# Patient Record
Sex: MALE | Race: WHITE | Employment: OTHER | URBAN - METROPOLITAN AREA
[De-identification: names, ages, dates, MRNs, and addresses within clinical notes are randomized per-mention and may not be internally consistent; named-entity substitution may affect disease eponyms.]

---

## 2018-09-14 ENCOUNTER — APPOINTMENT (OUTPATIENT)
Dept: GENERAL RADIOLOGY | Age: 78
End: 2018-09-14
Attending: EMERGENCY MEDICINE
Payer: MEDICARE

## 2018-09-14 ENCOUNTER — APPOINTMENT (OUTPATIENT)
Dept: CT IMAGING | Age: 78
End: 2018-09-14
Attending: EMERGENCY MEDICINE
Payer: MEDICARE

## 2018-09-14 ENCOUNTER — HOSPITAL ENCOUNTER (EMERGENCY)
Age: 78
Discharge: HOME | End: 2018-09-14
Attending: EMERGENCY MEDICINE
Payer: MEDICARE

## 2018-09-14 VITALS
SYSTOLIC BLOOD PRESSURE: 96 MMHG | BODY MASS INDEX: 31.16 KG/M2 | RESPIRATION RATE: 18 BRPM | HEIGHT: 64 IN | DIASTOLIC BLOOD PRESSURE: 84 MMHG | HEART RATE: 64 BPM | OXYGEN SATURATION: 100 % | WEIGHT: 182.5 LBS | TEMPERATURE: 97.5 F

## 2018-09-14 DIAGNOSIS — R42 LIGHTHEADEDNESS: Primary | ICD-10-CM

## 2018-09-14 LAB
ALBUMIN SERPL-MCNC: 3.7 G/DL (ref 3.4–5)
ALBUMIN/GLOB SERPL: 1.2 {RATIO} (ref 0.8–1.7)
ALP SERPL-CCNC: 81 U/L (ref 45–117)
ALT SERPL-CCNC: 29 U/L (ref 16–61)
ANION GAP SERPL CALC-SCNC: 12 MMOL/L (ref 3–18)
APAP SERPL-MCNC: <2 UG/ML (ref 10–30)
APTT PPP: 24.8 SEC (ref 23–36.4)
AST SERPL-CCNC: 21 U/L (ref 15–37)
BASOPHILS # BLD: 0 K/UL (ref 0–0.1)
BASOPHILS NFR BLD: 0 % (ref 0–2)
BILIRUB SERPL-MCNC: 0.7 MG/DL (ref 0.2–1)
BUN SERPL-MCNC: 12 MG/DL (ref 7–18)
BUN/CREAT SERPL: 11 (ref 12–20)
CALCIUM SERPL-MCNC: 8.6 MG/DL (ref 8.5–10.1)
CHLORIDE SERPL-SCNC: 103 MMOL/L (ref 100–108)
CK MB CFR SERPL CALC: 1.6 % (ref 0–4)
CK MB SERPL-MCNC: 2.8 NG/ML (ref 5–25)
CK SERPL-CCNC: 178 U/L (ref 39–308)
CO2 SERPL-SCNC: 24 MMOL/L (ref 21–32)
CREAT SERPL-MCNC: 1.14 MG/DL (ref 0.6–1.3)
DIFFERENTIAL METHOD BLD: ABNORMAL
EOSINOPHIL # BLD: 0.1 K/UL (ref 0–0.4)
EOSINOPHIL NFR BLD: 1 % (ref 0–5)
ERYTHROCYTE [DISTWIDTH] IN BLOOD BY AUTOMATED COUNT: 13.3 % (ref 11.6–14.5)
ETHANOL SERPL-MCNC: <3 MG/DL (ref 0–3)
GLOBULIN SER CALC-MCNC: 3.1 G/DL (ref 2–4)
GLUCOSE BLD STRIP.AUTO-MCNC: 172 MG/DL (ref 70–110)
GLUCOSE SERPL-MCNC: 165 MG/DL (ref 74–99)
HCT VFR BLD AUTO: 44 % (ref 36–48)
HGB BLD-MCNC: 15.2 G/DL (ref 13–16)
INR PPP: 1 (ref 0.8–1.2)
LYMPHOCYTES # BLD: 1 K/UL (ref 0.9–3.6)
LYMPHOCYTES NFR BLD: 14 % (ref 21–52)
MCH RBC QN AUTO: 32.1 PG (ref 24–34)
MCHC RBC AUTO-ENTMCNC: 34.5 G/DL (ref 31–37)
MCV RBC AUTO: 93 FL (ref 74–97)
MONOCYTES # BLD: 0.5 K/UL (ref 0.05–1.2)
MONOCYTES NFR BLD: 7 % (ref 3–10)
NEUTS SEG # BLD: 5.5 K/UL (ref 1.8–8)
NEUTS SEG NFR BLD: 78 % (ref 40–73)
PLATELET # BLD AUTO: 240 K/UL (ref 135–420)
PMV BLD AUTO: 10.1 FL (ref 9.2–11.8)
POTASSIUM SERPL-SCNC: 3.9 MMOL/L (ref 3.5–5.5)
PROT SERPL-MCNC: 6.8 G/DL (ref 6.4–8.2)
PROTHROMBIN TIME: 12.7 SEC (ref 11.5–15.2)
RBC # BLD AUTO: 4.73 M/UL (ref 4.7–5.5)
SALICYLATES SERPL-MCNC: <2.8 MG/DL (ref 2.8–20)
SODIUM SERPL-SCNC: 139 MMOL/L (ref 136–145)
TROPONIN I SERPL-MCNC: <0.02 NG/ML (ref 0–0.06)
WBC # BLD AUTO: 7.1 K/UL (ref 4.6–13.2)

## 2018-09-14 PROCEDURE — 74011250636 HC RX REV CODE- 250/636: Performed by: EMERGENCY MEDICINE

## 2018-09-14 PROCEDURE — 99285 EMERGENCY DEPT VISIT HI MDM: CPT

## 2018-09-14 PROCEDURE — 85730 THROMBOPLASTIN TIME PARTIAL: CPT | Performed by: EMERGENCY MEDICINE

## 2018-09-14 PROCEDURE — 80307 DRUG TEST PRSMV CHEM ANLYZR: CPT | Performed by: EMERGENCY MEDICINE

## 2018-09-14 PROCEDURE — 80053 COMPREHEN METABOLIC PANEL: CPT | Performed by: EMERGENCY MEDICINE

## 2018-09-14 PROCEDURE — 85025 COMPLETE CBC W/AUTO DIFF WBC: CPT | Performed by: EMERGENCY MEDICINE

## 2018-09-14 PROCEDURE — 93005 ELECTROCARDIOGRAM TRACING: CPT

## 2018-09-14 PROCEDURE — 70450 CT HEAD/BRAIN W/O DYE: CPT

## 2018-09-14 PROCEDURE — 71045 X-RAY EXAM CHEST 1 VIEW: CPT

## 2018-09-14 PROCEDURE — 82962 GLUCOSE BLOOD TEST: CPT

## 2018-09-14 PROCEDURE — 96360 HYDRATION IV INFUSION INIT: CPT

## 2018-09-14 PROCEDURE — 74011250637 HC RX REV CODE- 250/637: Performed by: EMERGENCY MEDICINE

## 2018-09-14 PROCEDURE — 85610 PROTHROMBIN TIME: CPT | Performed by: EMERGENCY MEDICINE

## 2018-09-14 PROCEDURE — 82550 ASSAY OF CK (CPK): CPT | Performed by: EMERGENCY MEDICINE

## 2018-09-14 RX ORDER — LISINOPRIL 10 MG/1
TABLET ORAL DAILY
COMMUNITY

## 2018-09-14 RX ORDER — ATORVASTATIN CALCIUM 20 MG/1
20 TABLET, FILM COATED ORAL DAILY
COMMUNITY

## 2018-09-14 RX ORDER — AMLODIPINE BESYLATE 5 MG/1
5 TABLET ORAL DAILY
COMMUNITY

## 2018-09-14 RX ORDER — DONEPEZIL HYDROCHLORIDE 10 MG/1
10 TABLET, FILM COATED ORAL
COMMUNITY

## 2018-09-14 RX ORDER — GUAIFENESIN 100 MG/5ML
81 LIQUID (ML) ORAL DAILY
COMMUNITY

## 2018-09-14 RX ORDER — ACETAMINOPHEN 500 MG
1000 TABLET ORAL
Status: COMPLETED | OUTPATIENT
Start: 2018-09-14 | End: 2018-09-14

## 2018-09-14 RX ADMIN — SODIUM CHLORIDE 1000 ML: 900 INJECTION, SOLUTION INTRAVENOUS at 11:31

## 2018-09-14 RX ADMIN — ACETAMINOPHEN 1000 MG: 500 TABLET, FILM COATED ORAL at 11:43

## 2018-09-14 NOTE — DISCHARGE INSTRUCTIONS
Lightheadedness or Faintness: Care Instructions  Your Care Instructions  Lightheadedness is a feeling that you are about to faint or \"pass out. \" You do not feel as if you or your surroundings are moving. It is different from vertigo, which is the feeling that you or things around you are spinning or tilting. Lightheadedness usually goes away or gets better when you lie down. If lightheadedness gets worse, it can lead to a fainting spell. It is common to feel lightheaded from time to time. Lightheadedness usually is not caused by a serious problem. It often is caused by a short-lasting drop in blood pressure and blood flow to your head that occurs when you get up too quickly from a seated or lying position. Follow-up care is a key part of your treatment and safety. Be sure to make and go to all appointments, and call your doctor if you are having problems. It's also a good idea to know your test results and keep a list of the medicines you take. How can you care for yourself at home? · Lie down for 1 or 2 minutes when you feel lightheaded. After lying down, sit up slowly and remain sitting for 1 to 2 minutes before slowly standing up. · Avoid movements, positions, or activities that have made you lightheaded in the past.  · Get plenty of rest, especially if you have a cold or flu, which can cause lightheadedness. · Make sure you drink plenty of fluids, especially if you have a fever or have been sweating. · Do not drive or put yourself and others in danger while you feel lightheaded. When should you call for help? Call 911 anytime you think you may need emergency care. For example, call if:    · You have symptoms of a stroke. These may include:  ¨ Sudden numbness, tingling, weakness, or loss of movement in your face, arm, or leg, especially on only one side of your body. ¨ Sudden vision changes. ¨ Sudden trouble speaking. ¨ Sudden confusion or trouble understanding simple statements.   ¨ Sudden problems with walking or balance. ¨ A sudden, severe headache that is different from past headaches.     · You have symptoms of a heart attack. These may include:  ¨ Chest pain or pressure, or a strange feeling in the chest.  ¨ Sweating. ¨ Shortness of breath. ¨ Nausea or vomiting. ¨ Pain, pressure, or a strange feeling in the back, neck, jaw, or upper belly or in one or both shoulders or arms. ¨ Lightheadedness or sudden weakness. ¨ A fast or irregular heartbeat. After you call 911, the  may tell you to chew 1 adult-strength or 2 to 4 low-dose aspirin. Wait for an ambulance. Do not try to drive yourself.    Watch closely for changes in your health, and be sure to contact your doctor if:    · Your lightheadedness gets worse or does not get better with home care. Where can you learn more? Go to http://danny-linda.info/. Enter P924 in the search box to learn more about \"Lightheadedness or Faintness: Care Instructions. \"  Current as of: November 20, 2017  Content Version: 11.7  © 9514-7885 [employer]. Care instructions adapted under license by [employer] (which disclaims liability or warranty for this information). If you have questions about a medical condition or this instruction, always ask your healthcare professional. [address] any warranty or liability for your use of this information.

## 2018-09-14 NOTE — ED PROVIDER NOTES
EMERGENCY DEPARTMENT HISTORY AND PHYSICAL EXAM 
 
Date: 9/14/2018 Patient Name: Sonya Carpenter History of Presenting Illness Chief Complaint Patient presents with  Dizziness History Provided By: Patient Chief Complaint: diffuse tremors Location: generalized Associated Symptoms: numbness, dizziness, vomiting, and tingling in bilateral legs Additional History (Context):  
10:53 AM 
Sonya Carpenter is a 65 y.o. male with PMHX of HTN and stroke per past EMR who presents to the emergency department via EMS C/O diffuse tremors. Associated sxs include numbness in lower legs bilaterally, dizziness, and leg tingling bilaterally. EMS responded to the house for the wife for dizziness but when they arrived she got better. He was feeling dizzy with some potential eye rolling while in the house, and while in the ambulance, he felt like he had to spit but he vomited. Pt states that he is unable to remember where he lives or who he lives with. No PSHx per past EMR. Allergy reported to PCN. No PSHx. Pt endorses being a non cigarette smoker, a non recreational drug user, and a non EtOH user. Hx and ROS limited due to pt mental status change. PCP: Chace Guzman MD 
 
Current Outpatient Prescriptions Medication Sig Dispense Refill  amLODIPine (NORVASC) 5 mg tablet Take 5 mg by mouth daily.  donepezil (ARICEPT) 10 mg tablet Take 10 mg by mouth nightly.  atorvastatin (LIPITOR) 20 mg tablet Take 20 mg by mouth daily.  aspirin 81 mg chewable tablet Take 81 mg by mouth daily.  lisinopril (PRINIVIL, ZESTRIL) 10 mg tablet Take  by mouth daily.  multivitamin (ONE A DAY) tablet Take 1 Tab by mouth daily. Past History Past Medical History: 
Past Medical History:  
Diagnosis Date  Hypertension  Stroke ([facility] [address] Past Surgical History: 
History reviewed. No pertinent surgical history. Family History: 
History reviewed. No pertinent family history. Social History: 
Social History Substance Use Topics  Smoking status: Never Smoker  Smokeless tobacco: Never Used  Alcohol use No  
 
 
Allergies: Allergies Allergen Reactions  Penicillins Rash Review of Systems Review of Systems Unable to perform ROS: Mental status change Gastrointestinal: Positive for vomiting. Neurological: Positive for dizziness, tremors and numbness (bilateral lower extremities). (+) Leg tingling bilaterally Physical Exam  
 
Vitals:  
 09/14/18 1045 09/14/18 1059 09/14/18 1100 BP: (!) 125/91 (!) 163/91 96/84 Pulse:  89 64 Resp: 27 20 18 Temp:  97.5 °F (36.4 °C) SpO2: 100% 100% 100% Weight:  82.8 kg (182 lb 8 oz) Height:  5' 4\" (1.626 m) Physical Exam  
Nursing note and vitals reviewed. Vital signs and nursing notes reviewed CONSTITUTIONAL: Shaky. Generalized Tremor. HEAD:  Normocephalic, atraumatic EYES: PERRL; EOM's intact. ENTM: Nose: no rhinorrhea; Throat: no erythema or exudate, mucous membranes moist 
Neck:  No JVD, supple without lymphadenopathy RESP: Chest clear, equal breath sounds. CV: S1 and S2 WNL; No murmurs, gallops or rubs. GI: Normal bowel sounds, abdomen soft and non-tender. No masses or organomegaly. : No costo-vertebral angle tenderness. BACK:  Non-tender UPPER EXT:  Normal inspection LOWER EXT: No edema, no calf tenderness. Distal pulses intact. NEURO: CN intact, reflexes 2/4 and sym, strength +5/5 and sym on bilateral upper extremities. Bilateral lower extremities are +3/5 and symmetric, sensation intact. Generalized Tremor. SKIN: No rashes; Normal for age and stage. PSYCH:  Alert and oriented, normal affect. Diagnostic Study Results Labs - Recent Results (from the past 12 hour(s)) EKG, 12 LEAD, INITIAL Collection Time: 09/14/18 10:45 AM  
Result Value Ref Range Ventricular Rate 59 BPM  
 Atrial Rate 59 BPM  
 P-R Interval 132 ms  QRS Duration 96 ms  
 Q-T Interval 446 ms  
 QTC Calculation (Bezet) 441 ms Calculated P Axis 62 degrees Calculated R Axis 5 degrees Calculated T Axis 12 degrees Diagnosis Sinus bradycardia with premature atrial complexes Cannot rule out Inferior infarct (cited on or before 16-JUL-2012) Abnormal ECG When compared with ECG of 16-JUL-2012 15:25, 
premature atrial complexes are now present CBC WITH AUTOMATED DIFF Collection Time: 09/14/18 11:00 AM  
Result Value Ref Range WBC 7.1 4.6 - 13.2 K/uL  
 RBC 4.73 4.70 - 5.50 M/uL  
 HGB 15.2 13.0 - 16.0 g/dL HCT 44.0 36.0 - 48.0 % MCV 93.0 74.0 - 97.0 FL  
 MCH 32.1 24.0 - 34.0 PG  
 MCHC 34.5 31.0 - 37.0 g/dL  
 RDW 13.3 11.6 - 14.5 % PLATELET 951 354 - 749 K/uL MPV 10.1 9.2 - 11.8 FL  
 NEUTROPHILS 78 (H) 40 - 73 % LYMPHOCYTES 14 (L) 21 - 52 % MONOCYTES 7 3 - 10 % EOSINOPHILS 1 0 - 5 % BASOPHILS 0 0 - 2 %  
 ABS. NEUTROPHILS 5.5 1.8 - 8.0 K/UL  
 ABS. LYMPHOCYTES 1.0 0.9 - 3.6 K/UL  
 ABS. MONOCYTES 0.5 0.05 - 1.2 K/UL  
 ABS. EOSINOPHILS 0.1 0.0 - 0.4 K/UL  
 ABS. BASOPHILS 0.0 0.0 - 0.1 K/UL  
 DF AUTOMATED PROTHROMBIN TIME + INR Collection Time: 09/14/18 11:00 AM  
Result Value Ref Range Prothrombin time 12.7 11.5 - 15.2 sec INR 1.0 0.8 - 1.2 METABOLIC PANEL, COMPREHENSIVE Collection Time: 09/14/18 11:00 AM  
Result Value Ref Range Sodium 139 136 - 145 mmol/L Potassium 3.9 3.5 - 5.5 mmol/L Chloride 103 100 - 108 mmol/L  
 CO2 24 21 - 32 mmol/L Anion gap 12 3.0 - 18 mmol/L Glucose 165 (H) 74 - 99 mg/dL BUN 12 7.0 - 18 MG/DL Creatinine 1.14 0.6 - 1.3 MG/DL  
 BUN/Creatinine ratio 11 (L) 12 - 20 GFR est AA >60 >60 ml/min/1.73m2 GFR est non-AA >60 >60 ml/min/1.73m2 Calcium 8.6 8.5 - 10.1 MG/DL Bilirubin, total 0.7 0.2 - 1.0 MG/DL  
 ALT (SGPT) 29 16 - 61 U/L  
 AST (SGOT) 21 15 - 37 U/L Alk. phosphatase 81 45 - 117 U/L Protein, total 6.8 6.4 - 8.2 g/dL Albumin 3.7 3.4 - 5.0 g/dL Globulin 3.1 2.0 - 4.0 g/dL A-G Ratio 1.2 0.8 - 1.7 PTT Collection Time: 09/14/18 11:00 AM  
Result Value Ref Range aPTT 24.8 23.0 - 36.4 SEC CARDIAC PANEL,(CK, CKMB & TROPONIN) Collection Time: 09/14/18 11:00 AM  
Result Value Ref Range  39 - 308 U/L  
 CK - MB 2.8 <3.6 ng/ml CK-MB Index 1.6 0.0 - 4.0 % Troponin-I, Qt. <0.02 0.00 - 0.06 NG/ML  
ACETAMINOPHEN Collection Time: 09/14/18 11:00 AM  
Result Value Ref Range Acetaminophen level <2 (L) 10.0 - 30.0 ug/mL SALICYLATE Collection Time: 09/14/18 11:00 AM  
Result Value Ref Range Salicylate level <0.4 (L) 2.8 - 20 MG/DL  
ETHYL ALCOHOL Collection Time: 09/14/18 11:00 AM  
Result Value Ref Range ALCOHOL(ETHYL),SERUM <3 0 - 3 MG/DL  
GLUCOSE, POC Collection Time: 09/14/18 11:04 AM  
Result Value Ref Range Glucose (POC) 172 (H) 70 - 110 mg/dL Radiologic Studies -  
XR CHEST PORT Final Result CT HEAD WO CONT Final Result CT Results  (Last 48 hours) 09/14/18 1130  CT HEAD WO CONT Final result Impression:  IMPRESSION:  
   
No acute intracranial abnormalities. Narrative:  EXAM: CT head INDICATION: Altered mental status COMPARISON: None. TECHNIQUE: Axial CT imaging of the head was performed without intravenous  
contrast.  
   
Dose reduction techniques used: Automated exposure control, adjustment of the  
mAs and/or kVp according to patient's size, and iterative reconstruction  
techniques. The specific techniques utilized on this CT exam have been  
documented in the patient's electronic medical record.  
   
   
_______________ FINDINGS:  
   
BRAIN AND POSTERIOR FOSSA: The sulci, folia, ventricles and basal cisterns are  
within normal limits for the patient?s age. There is no intracranial hemorrhage,  
mass effect, or midline shift.  There are areas of decreased attenuation in the  
 periventricular and subcortical white matter. There are no additional areas of  
abnormal parenchymal attenuation. EXTRA-AXIAL SPACES AND MENINGES: There are no abnormal extra-axial fluid  
collections. CALVARIUM: Intact. SINUSES: Clear. OTHER: None.  
   
_______________ CXR Results  (Last 48 hours) 09/14/18 1117  XR CHEST PORT Final result Impression:  IMPRESSION:  
   
No acute pulmonary findings Narrative:  EXAM: AP radiograph of the chest  
   
INDICATION: Dizziness COMPARISON: December 21, 2005  
   
_______________ FINDINGS:  
   
Normal heart size and mediastinal contour. Bilateral opacities are present and  
unchanged dating back to December 21, 2005. No consolidation, pleural effusion,  
or pneumothorax. No acute osseous abnormalities. _______________ Medications given in the ED- Medications  
sodium chloride 0.9 % bolus infusion 1,000 mL (0 mL IntraVENous IV Completed 9/14/18 1231)  
acetaminophen (TYLENOL) tablet 1,000 mg (1,000 mg Oral Given 9/14/18 1143) Medical Decision Making I am the first provider for this patient. I reviewed the vital signs, available nursing notes, past medical history, past surgical history, family history and social history. Vital Signs-Reviewed the patient's vital signs. Pulse Oximetry Analysis - 100% on room air. Cardiac Monitor: 
Rate: 59 bpm 
Rhythm: sinus bradycardia EKG interpretation: (Preliminary) 10:45 AM  
Sinus bradycardia at 59 bpm with premature atrial complexes. Cannot r/o inferior infarct, age undetermined. Abnormal ECG. No STEMI. EKG read by Teresa Krueger MD    
 
Records Reviewed: Nursing Notes and Old Medical Records Provider Notes (Medical Decision Making): DDx: Hypoglycemia, dementia, Parkinson's disease, ACS, metabolic syndrome, medication effect, intoxication, ingestion. Procedures: 
Procedures ED Course: 10:53 AM Initial assessment performed. The patients presenting problems have been discussed, and they are in agreement with the care plan formulated and outlined with them. I have encouraged them to ask questions as they arise throughout their visit. 1:48 PM Pt states he is feeling improved. Family (wife, son) is at bedside and states that he is at baseline which includes dementia with inability to remember where he lives, or who he lives with. Diagnosis and Disposition DISCHARGE NOTE: 
1:50 PM 
Marion Rose's  results have been reviewed with him. He has been counseled regarding his diagnosis, treatment, and plan. He verbally conveys understanding and agreement of the signs, symptoms, diagnosis, treatment and prognosis and additionally agrees to follow up as discussed. He also agrees with the care-plan and conveys that all of his questions have been answered. I have also provided discharge instructions for him that include: educational information regarding their diagnosis and treatment, and list of reasons why they would want to return to the ED prior to their follow-up appointment, should his condition change. He has been provided with education for proper emergency department utilization. CLINICAL IMPRESSION: 
 
1. Lightheadedness PLAN: 
1. D/C Home 2. Current Discharge Medication List  
  
CONTINUE these medications which have NOT CHANGED Details  
amLODIPine (NORVASC) 5 mg tablet Take 5 mg by mouth daily. donepezil (ARICEPT) 10 mg tablet Take 10 mg by mouth nightly. atorvastatin (LIPITOR) 20 mg tablet Take 20 mg by mouth daily. aspirin 81 mg chewable tablet Take 81 mg by mouth daily. lisinopril (PRINIVIL, ZESTRIL) 10 mg tablet Take  by mouth daily. multivitamin (ONE A DAY) tablet Take 1 Tab by mouth daily. 3.  
Follow-up Information Follow up With Details Comments Contact Info Princess Willard MD Schedule an appointment as soon as possible for a visit For Primary Care Follow Up [address] [place] [address] [place]  
[phone] [facility] EMERGENCY DEPT Go to If symptoms worsen, As needed [address] [facility] [id number] 
[phone]  
  
 
_______________________________ Attestations: This note is prepared by Jay Grant, acting as Scribe for Comfort Gibbons MD. 
 
Comfort Gibbons MD:  The scribe's documentation has been prepared under my direction and personally reviewed by me in its entirety. I confirm that the note above accurately reflects all work, treatment, procedures, and medical decision making performed by me. 
_______________________________

## 2018-09-14 NOTE — ED TRIAGE NOTES
Patient with complaints of dizziness while on the toilet having a bowel movement. Patient with complaints of shaking and vomited in the back of the medic.

## 2018-10-26 LAB
ATRIAL RATE: 59 BPM
CALCULATED P AXIS, ECG09: 62 DEGREES
CALCULATED R AXIS, ECG10: 5 DEGREES
CALCULATED T AXIS, ECG11: 12 DEGREES
DIAGNOSIS, 93000: NORMAL
P-R INTERVAL, ECG05: 132 MS
Q-T INTERVAL, ECG07: 446 MS
QRS DURATION, ECG06: 96 MS
QTC CALCULATION (BEZET), ECG08: 441 MS
VENTRICULAR RATE, ECG03: 59 BPM

## 2018-12-10 ENCOUNTER — APPOINTMENT (OUTPATIENT)
Dept: GENERAL RADIOLOGY | Age: 78
End: 2018-12-10
Attending: EMERGENCY MEDICINE
Payer: MEDICARE

## 2018-12-10 ENCOUNTER — HOSPITAL ENCOUNTER (EMERGENCY)
Age: 78
Discharge: HOME | End: 2018-12-10
Attending: EMERGENCY MEDICINE
Payer: MEDICARE

## 2018-12-10 VITALS
RESPIRATION RATE: 13 BRPM | HEIGHT: 65 IN | BODY MASS INDEX: 30.34 KG/M2 | HEART RATE: 63 BPM | SYSTOLIC BLOOD PRESSURE: 135 MMHG | DIASTOLIC BLOOD PRESSURE: 65 MMHG | WEIGHT: 182.1 LBS | TEMPERATURE: 98.6 F | OXYGEN SATURATION: 100 %

## 2018-12-10 DIAGNOSIS — R53.1 GENERALIZED WEAKNESS: ICD-10-CM

## 2018-12-10 DIAGNOSIS — N30.00 ACUTE CYSTITIS WITHOUT HEMATURIA: Primary | ICD-10-CM

## 2018-12-10 LAB
ABO + RH BLD: NORMAL
ALBUMIN SERPL-MCNC: 3.6 G/DL (ref 3.4–5)
ALBUMIN/GLOB SERPL: 1.2 {RATIO} (ref 0.8–1.7)
ALP SERPL-CCNC: 90 U/L (ref 45–117)
ALT SERPL-CCNC: 27 U/L (ref 16–61)
ANION GAP SERPL CALC-SCNC: 5 MMOL/L (ref 3–18)
APPEARANCE UR: CLEAR
APTT PPP: 25.8 SEC (ref 23–36.4)
AST SERPL-CCNC: 22 U/L (ref 15–37)
BACTERIA URNS QL MICRO: ABNORMAL /HPF
BASOPHILS # BLD: 0 K/UL (ref 0–0.1)
BASOPHILS NFR BLD: 1 % (ref 0–2)
BILIRUB SERPL-MCNC: 0.8 MG/DL (ref 0.2–1)
BILIRUB UR QL: NEGATIVE
BLOOD GROUP ANTIBODIES SERPL: NORMAL
BUN SERPL-MCNC: 11 MG/DL (ref 7–18)
BUN/CREAT SERPL: 10
CALCIUM SERPL-MCNC: 8.2 MG/DL (ref 8.5–10.1)
CHLORIDE SERPL-SCNC: 106 MMOL/L (ref 100–108)
CO2 SERPL-SCNC: 29 MMOL/L (ref 21–32)
COLOR UR: YELLOW
CREAT SERPL-MCNC: 1.05 MG/DL (ref 0.6–1.3)
DIFFERENTIAL METHOD BLD: ABNORMAL
EOSINOPHIL # BLD: 0.2 K/UL (ref 0–0.4)
EOSINOPHIL NFR BLD: 3 % (ref 0–5)
EPITH CASTS URNS QL MICRO: ABNORMAL /LPF (ref 0–5)
ERYTHROCYTE [DISTWIDTH] IN BLOOD BY AUTOMATED COUNT: 13.4 % (ref 11.6–14.5)
GLOBULIN SER CALC-MCNC: 2.9 G/DL (ref 2–4)
GLUCOSE SERPL-MCNC: 120 MG/DL (ref 74–99)
GLUCOSE UR STRIP.AUTO-MCNC: NEGATIVE MG/DL
HCT VFR BLD AUTO: 44.2 % (ref 36–48)
HGB BLD-MCNC: 15.1 G/DL (ref 13–16)
HGB UR QL STRIP: NEGATIVE
INR PPP: 1 (ref 0.8–1.2)
KETONES UR QL STRIP.AUTO: ABNORMAL MG/DL
LEUKOCYTE ESTERASE UR QL STRIP.AUTO: ABNORMAL
LIPASE SERPL-CCNC: 135 U/L (ref 73–393)
LYMPHOCYTES # BLD: 1.1 K/UL (ref 0.9–3.6)
LYMPHOCYTES NFR BLD: 20 % (ref 21–52)
MAGNESIUM SERPL-MCNC: 2.1 MG/DL (ref 1.6–2.6)
MCH RBC QN AUTO: 32.4 PG (ref 24–34)
MCHC RBC AUTO-ENTMCNC: 34.2 G/DL (ref 31–37)
MCV RBC AUTO: 94.8 FL (ref 74–97)
MONOCYTES # BLD: 0.4 K/UL (ref 0.05–1.2)
MONOCYTES NFR BLD: 6 % (ref 3–10)
MUCOUS THREADS URNS QL MICRO: ABNORMAL /LPF
NEUTS SEG # BLD: 3.9 K/UL (ref 1.8–8)
NEUTS SEG NFR BLD: 70 % (ref 40–73)
NITRITE UR QL STRIP.AUTO: NEGATIVE
PH UR STRIP: 7 [PH] (ref 5–8)
PLATELET # BLD AUTO: 254 K/UL (ref 135–420)
PMV BLD AUTO: 10.1 FL (ref 9.2–11.8)
POTASSIUM SERPL-SCNC: 3.6 MMOL/L (ref 3.5–5.5)
PROT SERPL-MCNC: 6.5 G/DL (ref 6.4–8.2)
PROT UR STRIP-MCNC: NEGATIVE MG/DL
PROTHROMBIN TIME: 12.9 SEC (ref 11.5–15.2)
RBC # BLD AUTO: 4.66 M/UL (ref 4.7–5.5)
RBC #/AREA URNS HPF: NEGATIVE /HPF (ref 0–5)
SODIUM SERPL-SCNC: 140 MMOL/L (ref 136–145)
SP GR UR REFRACTOMETRY: 1.02 (ref 1–1.03)
SPECIMEN EXP DATE BLD: NORMAL
UROBILINOGEN UR QL STRIP.AUTO: 1 EU/DL (ref 0.2–1)
WBC # BLD AUTO: 5.6 K/UL (ref 4.6–13.2)
WBC URNS QL MICRO: ABNORMAL /HPF (ref 0–5)

## 2018-12-10 PROCEDURE — 74011250637 HC RX REV CODE- 250/637: Performed by: EMERGENCY MEDICINE

## 2018-12-10 PROCEDURE — 74011250636 HC RX REV CODE- 250/636: Performed by: EMERGENCY MEDICINE

## 2018-12-10 PROCEDURE — 83690 ASSAY OF LIPASE: CPT

## 2018-12-10 PROCEDURE — 83735 ASSAY OF MAGNESIUM: CPT

## 2018-12-10 PROCEDURE — 81001 URINALYSIS AUTO W/SCOPE: CPT

## 2018-12-10 PROCEDURE — 80053 COMPREHEN METABOLIC PANEL: CPT

## 2018-12-10 PROCEDURE — 86901 BLOOD TYPING SEROLOGIC RH(D): CPT

## 2018-12-10 PROCEDURE — 85025 COMPLETE CBC W/AUTO DIFF WBC: CPT

## 2018-12-10 PROCEDURE — 85610 PROTHROMBIN TIME: CPT

## 2018-12-10 PROCEDURE — 71045 X-RAY EXAM CHEST 1 VIEW: CPT

## 2018-12-10 PROCEDURE — 96360 HYDRATION IV INFUSION INIT: CPT

## 2018-12-10 PROCEDURE — 93005 ELECTROCARDIOGRAM TRACING: CPT

## 2018-12-10 PROCEDURE — 85730 THROMBOPLASTIN TIME PARTIAL: CPT

## 2018-12-10 PROCEDURE — 99285 EMERGENCY DEPT VISIT HI MDM: CPT

## 2018-12-10 RX ORDER — LEVOFLOXACIN 5 MG/ML
750 INJECTION, SOLUTION INTRAVENOUS ONCE
Status: DISCONTINUED | OUTPATIENT
Start: 2018-12-10 | End: 2018-12-10

## 2018-12-10 RX ORDER — LEVOFLOXACIN 750 MG/1
750 TABLET ORAL DAILY
Qty: 7 TAB | Refills: 0 | Status: SHIPPED | OUTPATIENT
Start: 2018-12-10

## 2018-12-10 RX ORDER — LEVOFLOXACIN 750 MG/1
750 TABLET ORAL
Status: COMPLETED | OUTPATIENT
Start: 2018-12-10 | End: 2018-12-10

## 2018-12-10 RX ADMIN — LEVOFLOXACIN 750 MG: 750 TABLET, FILM COATED ORAL at 15:13

## 2018-12-10 RX ADMIN — SODIUM CHLORIDE 1000 ML: 900 INJECTION, SOLUTION INTRAVENOUS at 13:13

## 2018-12-10 NOTE — DISCHARGE INSTRUCTIONS

## 2018-12-10 NOTE — ED PROVIDER NOTES
EMERGENCY DEPARTMENT HISTORY AND PHYSICAL EXAM 
 
Date: 12/10/2018 Patient Name: Glenys Parham History of Presenting Illness Chief Complaint Patient presents with  Dizziness History Provided By: Patient Chief Complaint: dizziness Duration: since this morning Timing:  Intermittent Location: generalized Severity: 8 out of 10 Associated Symptoms: 8/10 frontal HA described as \"on fire\" and sore, rhinorrhea x 2 days, hematochezia yesterday, abd tightness, and gait disturbance. Additional History (Context):  
12:57 PM 
Glenys Parham is a 77 y.o. male with PMHX of HTN, dementia, and stroke who presents to the emergency department C/O dizziness onset this morning. Associated sxs include 8/10 frontal HA described as \"on fire\" and sore, abd tightness, rhinorrhea x 2 days, hematochezia yesterday, and gait disturbance. Pt states that the headache started when he lost his balance a couple days ago. Allergy reported to N. No PSHx. Pt denies chest pain, decreased appetite, cigarette use, EtOH use, weakness, visual disturbance, fever, chills, sore throat, dysuria, hematuria, cardiac hx, N/V, and any other sxs or complaints. PCP: Frantz Mcnair MD 
 
Current Facility-Administered Medications Medication Dose Route Frequency Provider Last Rate Last Dose  levoFLOXacin (LEVAQUIN) 750 mg in D5W IVPB  750 mg IntraVENous ONCE Lora Tran,       
 
Current Outpatient Medications Medication Sig Dispense Refill  amLODIPine (NORVASC) 5 mg tablet Take 5 mg by mouth daily.  donepezil (ARICEPT) 10 mg tablet Take 10 mg by mouth nightly.  aspirin 81 mg chewable tablet Take 81 mg by mouth daily.  lisinopril (PRINIVIL, ZESTRIL) 10 mg tablet Take  by mouth daily.  multivitamin (ONE A DAY) tablet Take 1 Tab by mouth daily.  atorvastatin (LIPITOR) 20 mg tablet Take 20 mg by mouth daily. Past History Past Medical History: Past Medical History:  
Diagnosis Date  Hypertension  Stroke ([facility] [address] Past Surgical History: No past surgical history on file. Family History: No family history on file. Social History: 
Social History Tobacco Use  Smoking status: Never Smoker  Smokeless tobacco: Never Used Substance Use Topics  Alcohol use: No  
 Drug use: Not on file Allergies: Allergies Allergen Reactions  Penicillins Rash Review of Systems Review of Systems Constitutional: Negative for appetite change, chills and fever. HENT: Positive for rhinorrhea. Negative for sore throat. Eyes: Negative for visual disturbance. Cardiovascular: Negative for chest pain. Gastrointestinal: Positive for blood in stool. Negative for nausea and vomiting.  
     (+) Abd tightness Genitourinary: Negative for dysuria and hematuria. Musculoskeletal: Positive for gait problem. Neurological: Positive for dizziness and headaches. Negative for weakness. All other systems reviewed and are negative. Physical Exam  
 
Vitals:  
 12/10/18 1251 12/10/18 1252 12/10/18 1300 12/10/18 1330 BP:  152/75  121/64 Pulse:  (!) 54 62 (!) 55 Resp:  13 14 17 Temp:  98.6 °F (37 °C) SpO2:  100% Weight: 82.6 kg (182 lb 1.6 oz) Height: 5' 5\" (1.651 m) Physical Exam  
Nursing note and vitals reviewed. Constitutional: Elderly  male. no acute distress Head: Normocephalic, Atraumatic Eyes: Pupils are equal, round, and reactive to light, EOMI, no scleral icterus, no conjunctival pallor ENT: moist mucous membranes, hearing intact Neck: Supple, non-tender Cardiovascular: Regular rate and rhythm, no murmurs, rubs, or gallops Chest: Normal work of breathing and chest excursion bilaterally Lungs: Clear to ausculation bilaterally, no wheezes, no rhonchi Abdomen: Soft, non tender, non distended, normoactive bowel sounds Back: No evidence of trauma or deformity Extremities: No evidence of trauma or deformity, no LE edema Skin: Warm and dry, normal cap refill Neuro: Alert and appropriate, CN intact, normal speech, moving all 4 extremities freely and symmetrically. Finger to nose intact. Psychiatric: Cooperative, appropriate mood Diagnostic Study Results Labs - Recent Results (from the past 12 hour(s)) EKG, 12 LEAD, INITIAL Collection Time: 12/10/18 12:33 PM  
Result Value Ref Range Ventricular Rate 53 BPM  
 Atrial Rate 53 BPM  
 P-R Interval 128 ms QRS Duration 86 ms  
 Q-T Interval 430 ms QTC Calculation (Bezet) 403 ms Calculated P Axis 54 degrees Calculated R Axis 13 degrees Calculated T Axis 12 degrees Diagnosis Sinus bradycardia with premature atrial complexes Otherwise normal ECG When compared with ECG of 14-SEP-2018 10:45, Minimal criteria for Inferior infarct are no longer present CBC WITH AUTOMATED DIFF Collection Time: 12/10/18 12:45 PM  
Result Value Ref Range WBC 5.6 4.6 - 13.2 K/uL  
 RBC 4.66 (L) 4.70 - 5.50 M/uL  
 HGB 15.1 13.0 - 16.0 g/dL HCT 44.2 36.0 - 48.0 % MCV 94.8 74.0 - 97.0 FL  
 MCH 32.4 24.0 - 34.0 PG  
 MCHC 34.2 31.0 - 37.0 g/dL  
 RDW 13.4 11.6 - 14.5 % PLATELET 487 922 - 262 K/uL MPV 10.1 9.2 - 11.8 FL  
 NEUTROPHILS 70 40 - 73 % LYMPHOCYTES 20 (L) 21 - 52 % MONOCYTES 6 3 - 10 % EOSINOPHILS 3 0 - 5 % BASOPHILS 1 0 - 2 %  
 ABS. NEUTROPHILS 3.9 1.8 - 8.0 K/UL  
 ABS. LYMPHOCYTES 1.1 0.9 - 3.6 K/UL  
 ABS. MONOCYTES 0.4 0.05 - 1.2 K/UL  
 ABS. EOSINOPHILS 0.2 0.0 - 0.4 K/UL  
 ABS. BASOPHILS 0.0 0.0 - 0.1 K/UL  
 DF AUTOMATED METABOLIC PANEL, COMPREHENSIVE Collection Time: 12/10/18 12:45 PM  
Result Value Ref Range Sodium 140 136 - 145 mmol/L Potassium 3.6 3.5 - 5.5 mmol/L Chloride 106 100 - 108 mmol/L  
 CO2 29 21 - 32 mmol/L Anion gap 5 3.0 - 18 mmol/L Glucose 120 (H) 74 - 99 mg/dL  BUN 11 7.0 - 18 MG/DL  
 Creatinine 1.05 0.6 - 1.3 MG/DL  
 BUN/Creatinine ratio 10 GFR est AA >60 >60 ml/min/1.73m2 GFR est non-AA >60 >60 ml/min/1.73m2 Calcium 8.2 (L) 8.5 - 10.1 MG/DL Bilirubin, total 0.8 0.2 - 1.0 MG/DL  
 ALT (SGPT) 27 16 - 61 U/L  
 AST (SGOT) 22 15 - 37 U/L Alk. phosphatase 90 45 - 117 U/L Protein, total 6.5 6.4 - 8.2 g/dL Albumin 3.6 3.4 - 5.0 g/dL Globulin 2.9 2.0 - 4.0 g/dL A-G Ratio 1.2 0.8 - 1.7 LIPASE Collection Time: 12/10/18 12:45 PM  
Result Value Ref Range Lipase 135 73 - 393 U/L MAGNESIUM Collection Time: 12/10/18 12:45 PM  
Result Value Ref Range Magnesium 2.1 1.6 - 2.6 mg/dL PROTHROMBIN TIME + INR Collection Time: 12/10/18 12:45 PM  
Result Value Ref Range Prothrombin time 12.9 11.5 - 15.2 sec INR 1.0 0.8 - 1.2 PTT Collection Time: 12/10/18 12:45 PM  
Result Value Ref Range aPTT 25.8 23.0 - 36.4 SEC  
TYPE & SCREEN Collection Time: 12/10/18  1:16 PM  
Result Value Ref Range Crossmatch Expiration 12/13/2018 ABO/Rh(D) A POSITIVE Antibody screen NEG   
URINALYSIS W/ RFLX MICROSCOPIC Collection Time: 12/10/18  1:25 PM  
Result Value Ref Range Color YELLOW Appearance CLEAR Specific gravity 1.023 1.005 - 1.030    
 pH (UA) 7.0 5.0 - 8.0 Protein NEGATIVE  NEG mg/dL Glucose NEGATIVE  NEG mg/dL Ketone TRACE (A) NEG mg/dL Bilirubin NEGATIVE  NEG Blood NEGATIVE  NEG Urobilinogen 1.0 0.2 - 1.0 EU/dL Nitrites NEGATIVE  NEG Leukocyte Esterase TRACE (A) NEG URINE MICROSCOPIC ONLY Collection Time: 12/10/18  1:25 PM  
Result Value Ref Range WBC 2 to 5 0 - 5 /hpf  
 RBC NEGATIVE  0 - 5 /hpf Epithelial cells 2+ 0 - 5 /lpf Bacteria 2+ (A) NEG /hpf Mucus 1+ (A) NEG /lpf Radiologic Studies -  
XR CHEST PORT    (Results Pending) CT Results  (Last 48 hours) None CXR Results  (Last 48 hours) None RADIOLOGY FINDINGS Chest X-ray shows NAP 
 Pending review by Radiologist 
Recorded by Esteban London ED Scribe, as dictated by Holley Diehl DO Medications given in the ED- Medications  
levoFLOXacin (LEVAQUIN) 750 mg in D5W IVPB (not administered)  
sodium chloride 0.9 % bolus infusion 1,000 mL (1,000 mL IntraVENous New Bag 12/10/18 1313) Medical Decision Making I am the first provider for this patient. I reviewed the vital signs, available nursing notes, past medical history, past surgical history, family history and social history. Vital Signs-Reviewed the patient's vital signs. Pulse Oximetry Analysis - 100% on room air. Cardiac Monitor: 
Rate: 68 bpm 
Rhythm: sinus rhythm EKG interpretation: (Preliminary) 12:33 PM  
Sinus bradycardia with premature atrial complexes at 53 bpm. [place] interval at 128 ms. QTc at 403 ms. Negative STEMI. EKG read by Holley Diehl DO at 12:39 PM  
 
Records Reviewed: Nursing Notes and Old Medical Records Provider Notes (Medical Decision Making): 77year old male with generalized weakness and feeling as if legs are \"buckling under\" him. On exam, NAD. Appropriate vitals. No signs of rectal bleeding or focal neuro deficits. Will check screening lab work and evaluate for infectious etiology. Check UA and CXR. Procedures: 
Procedures PROCEDURE NOTE - RECTAL EXAM:  
1:21 PM 
Performed by: Holley Diehl DO 
Rectal exam performed. Brown stool was collected. Stool was Hemoccult tested, and found to be heme Negative. The procedure took 1-15 minutes, and pt tolerated well. Written by Esteban London ED Scribe, as dictated by Holley Diehl DO. 
  
ED Course:  
12:57 PM Initial assessment performed. The patients presenting problems have been discussed, and they are in agreement with the care plan formulated and outlined with them. I have encouraged them to ask questions as they arise throughout their visit. 2:45 PM Hgb stable at 15.1. No metabolic derangements. No focal consolidation on CXR. UA indicative of UTI which could explain his gen weakness. Will be given a dose of Levaquin ( pt allergic to PCN) and DC with Levaquin. Although Donepezil could prolong QTc w/ Levaquin, pt's QTC is 403 and thus lower risk. Diagnosis and Disposition DISCHARGE NOTE: 
2:48 PM 
Guille Hoskins [place]'s  results have been reviewed with him. He has been counseled regarding his diagnosis, treatment, and plan. He verbally conveys understanding and agreement of the signs, symptoms, diagnosis, treatment and prognosis and additionally agrees to follow up as discussed. He also agrees with the care-plan and conveys that all of his questions have been answered. I have also provided discharge instructions for him that include: educational information regarding their diagnosis and treatment, and list of reasons why they would want to return to the ED prior to their follow-up appointment, should his condition change. He has been provided with education for proper emergency department utilization. CLINICAL IMPRESSION: 
 
1. Acute cystitis without hematuria 2. Generalized weakness PLAN: 
1. D/C Home 2. Current Discharge Medication List  
  
 
3. Follow-up Information Follow up With Specialties Details Why Contact Info Travis Hayes MD Family Practice Schedule an appointment as soon as possible for a visit in 2 days For Primary Care Follow Up [address] [place] [address] 
[phone] [facility] EMERGENCY DEPT Emergency Medicine Go to If symptoms worsen, As needed [address] [place] [id number] 
[phone]  
  
 
_______________________________ Attestations: This note is prepared by Mike Capone, acting as Scribe for Ronaldo Green DO.  
 
Ronaldo Green DO:  The scribe's documentation has been prepared under my direction and personally reviewed by me in its entirety. I confirm that the note above accurately reflects all work, treatment, procedures, and medical decision making performed by me. 
_______________________________

## 2018-12-31 LAB
ATRIAL RATE: 53 BPM
CALCULATED P AXIS, ECG09: 54 DEGREES
CALCULATED R AXIS, ECG10: 13 DEGREES
CALCULATED T AXIS, ECG11: 12 DEGREES
DIAGNOSIS, 93000: NORMAL
P-R INTERVAL, ECG05: 128 MS
Q-T INTERVAL, ECG07: 430 MS
QRS DURATION, ECG06: 86 MS
QTC CALCULATION (BEZET), ECG08: 403 MS
VENTRICULAR RATE, ECG03: 53 BPM